# Patient Record
Sex: MALE | Race: WHITE | URBAN - METROPOLITAN AREA
[De-identification: names, ages, dates, MRNs, and addresses within clinical notes are randomized per-mention and may not be internally consistent; named-entity substitution may affect disease eponyms.]

---

## 2017-08-23 ENCOUNTER — IMPORTED ENCOUNTER (OUTPATIENT)
Dept: URBAN - METROPOLITAN AREA CLINIC 50 | Facility: CLINIC | Age: 82
End: 2017-08-23

## 2018-08-29 ENCOUNTER — IMPORTED ENCOUNTER (OUTPATIENT)
Dept: URBAN - METROPOLITAN AREA CLINIC 50 | Facility: CLINIC | Age: 83
End: 2018-08-29

## 2019-05-16 NOTE — PATIENT DISCUSSION
SUBJECTIVE VISUAL DISTURBANCE, OU: DILATED OPHTHALMIC EXAM UNREMARKABLE, OU. SYMPTOMS SUGGESTIVE OF TIA. RECOMMEND PATIENT SEE DR. Tayler Gross FOR NEXT AVAILABLE ANNUAL PHYSICAL AND CARDIOVASCULAR WORKUP. PATIENT ADVISED TO SEEK CARE ASAP/GO TO ER IF SYMPTOMS ONSET AGAIN.

## 2019-05-16 NOTE — PATIENT DISCUSSION
MYOPIA/ASTIGMATISM/PRESBYOPIA, OU: PRESCRIBED GLASSES AND MONOVISION CONTACTS. INTERESTED IN MONOVISION LASIK. REFER TO DR. SHELDON FOR LASIK EVAL. FOLLOW-UP AS SCHEDULED OR SOONER IF ADJSUTMENTS TO CONTACT LENS RX IS NEEDED ONCE PATIENT WEARS TRIALS AT HOME AND WORK.

## 2019-05-16 NOTE — PATIENT DISCUSSION
GLAUCOMA SUSPECT, OU: ENLARGED OPTIC NERVE CUPPING WITH STABLE RNFL OCT AND IOP OU. RETURN FOR FOLLOW UP AS SCHEDULED.

## 2019-09-11 ENCOUNTER — IMPORTED ENCOUNTER (OUTPATIENT)
Dept: URBAN - METROPOLITAN AREA CLINIC 50 | Facility: CLINIC | Age: 84
End: 2019-09-11

## 2021-04-18 ASSESSMENT — VISUAL ACUITY
OD_CC: J1+
OS_CC: J1+
OD_SC: 20/25
OD_SC: 20/25+1
OS_PH: 20/25
OS_SC: 20/30-1
OD_CC: J1+
OS_SC: 20/40
OS_SC: 20/40
OD_CC: J1+
OS_CC: J1+
OS_PH: 20/30
OS_CC: J1+
OD_SC: 20/25-

## 2021-04-18 ASSESSMENT — TONOMETRY
OD_IOP_MMHG: 16
OD_IOP_MMHG: 16
OS_IOP_MMHG: 16
OD_IOP_MMHG: 16
OS_IOP_MMHG: 16
OS_IOP_MMHG: 16

## 2021-05-25 NOTE — PATIENT DISCUSSION
ASTIGMATISM (regular) WITH PRESBYOPIA OU: PRESCRIBED GLASSES AND ORDERED CONTACT TRIAL- MONOVISION LENS IN OS ONLY. WILL CALL PATIENT WHEN ARRIVES- WILL CALL/RTC IF NOTING DIFFICULTY WITH VISION OR COMFORT OF CL TRIAL, OTHERWISE OKAY TO FOLLOW-UP AS SCHEDULED.

## 2022-06-10 NOTE — PATIENT DISCUSSION
Patient given Rx for glasses. Monovision CL trial dispensed for use OS only (+1.75). Pt to call/rtc if noting difficulty with comfort or vision with new lens. If noting difficulty will order +2.00-0.75x150 for OS and also a set to switch dominance (OD +1.75 OS plano-0.75x150).

## 2022-11-11 ENCOUNTER — PREPPED CHART (OUTPATIENT)
Dept: URBAN - METROPOLITAN AREA CLINIC 49 | Facility: CLINIC | Age: 87
End: 2022-11-11

## 2022-12-08 ENCOUNTER — NEW PATIENT (OUTPATIENT)
Dept: URBAN - METROPOLITAN AREA CLINIC 49 | Facility: CLINIC | Age: 87
End: 2022-12-08

## 2022-12-08 PROCEDURE — 92134 CPTRZ OPH DX IMG PST SGM RTA: CPT

## 2022-12-08 PROCEDURE — 92004 COMPRE OPH EXAM NEW PT 1/>: CPT

## 2022-12-08 ASSESSMENT — KERATOMETRY
OD_K1POWER_DIOPTERS: 44.75
OS_K1POWER_DIOPTERS: 44.00
OD_K2POWER_DIOPTERS: 44.75
OD_AXISANGLE2_DEGREES: 90
OD_AXISANGLE_DEGREES: 0
OS_AXISANGLE2_DEGREES: 161
OS_AXISANGLE_DEGREES: 71
OS_K2POWER_DIOPTERS: 45.00

## 2022-12-08 ASSESSMENT — VISUAL ACUITY
OD_SC: 20/50
OU_SC: J2 @ 15IN
OS_SC: 20/40+1
OS_PH: 20/30
OD_PH: 20/30
OU_SC: 20/40-2

## 2022-12-08 ASSESSMENT — TONOMETRY
OS_IOP_MMHG: 17
OD_IOP_MMHG: 17